# Patient Record
Sex: FEMALE | Race: WHITE
[De-identification: names, ages, dates, MRNs, and addresses within clinical notes are randomized per-mention and may not be internally consistent; named-entity substitution may affect disease eponyms.]

---

## 2017-02-21 ENCOUNTER — HOSPITAL ENCOUNTER (OUTPATIENT)
Dept: HOSPITAL 45 - C.PAPS | Age: 57
Discharge: HOME | End: 2017-02-21
Attending: OBSTETRICS & GYNECOLOGY
Payer: COMMERCIAL

## 2017-02-21 DIAGNOSIS — Z12.4: Primary | ICD-10-CM

## 2017-03-22 ENCOUNTER — HOSPITAL ENCOUNTER (OUTPATIENT)
Dept: HOSPITAL 45 - C.ULTR | Age: 57
Discharge: HOME | End: 2017-03-22
Attending: INTERNAL MEDICINE
Payer: COMMERCIAL

## 2017-03-22 ENCOUNTER — HOSPITAL ENCOUNTER (INPATIENT)
Dept: HOSPITAL 45 - C.EDB | Age: 57
LOS: 2 days | Discharge: HOME | DRG: 419 | End: 2017-03-24
Attending: SURGERY | Admitting: SURGERY
Payer: COMMERCIAL

## 2017-03-22 ENCOUNTER — HOSPITAL ENCOUNTER (OUTPATIENT)
Dept: HOSPITAL 45 - C.MAMM | Age: 57
Discharge: HOME | End: 2017-03-22
Attending: OBSTETRICS & GYNECOLOGY
Payer: COMMERCIAL

## 2017-03-22 ENCOUNTER — HOSPITAL ENCOUNTER (OUTPATIENT)
Dept: HOSPITAL 45 - C.LABBFT | Age: 57
Discharge: HOME | End: 2017-03-22
Attending: INTERNAL MEDICINE
Payer: COMMERCIAL

## 2017-03-22 VITALS
TEMPERATURE: 97.88 F | HEART RATE: 66 BPM | OXYGEN SATURATION: 99 % | SYSTOLIC BLOOD PRESSURE: 153 MMHG | DIASTOLIC BLOOD PRESSURE: 93 MMHG

## 2017-03-22 VITALS
BODY MASS INDEX: 28.59 KG/M2 | HEIGHT: 66 IN | BODY MASS INDEX: 28.59 KG/M2 | HEIGHT: 66 IN | WEIGHT: 177.91 LBS | WEIGHT: 177.91 LBS

## 2017-03-22 VITALS
DIASTOLIC BLOOD PRESSURE: 73 MMHG | OXYGEN SATURATION: 96 % | TEMPERATURE: 98.42 F | SYSTOLIC BLOOD PRESSURE: 120 MMHG | HEART RATE: 71 BPM

## 2017-03-22 DIAGNOSIS — K80.20: Primary | ICD-10-CM

## 2017-03-22 DIAGNOSIS — Z87.891: ICD-10-CM

## 2017-03-22 DIAGNOSIS — E55.9: ICD-10-CM

## 2017-03-22 DIAGNOSIS — R10.13: Primary | ICD-10-CM

## 2017-03-22 DIAGNOSIS — R73.01: ICD-10-CM

## 2017-03-22 DIAGNOSIS — E03.9: ICD-10-CM

## 2017-03-22 DIAGNOSIS — I10: ICD-10-CM

## 2017-03-22 DIAGNOSIS — K80.00: Primary | ICD-10-CM

## 2017-03-22 DIAGNOSIS — R92.0: Primary | ICD-10-CM

## 2017-03-22 LAB
ALBUMIN/GLOB SERPL: 1 {RATIO} (ref 0.9–2)
ALP SERPL-CCNC: 63 U/L (ref 45–117)
ALT SERPL-CCNC: 28 U/L (ref 12–78)
AMYLASE SERPL-CCNC: 46 U/L (ref 25–115)
ANION GAP SERPL CALC-SCNC: 9 MMOL/L (ref 3–11)
APPEARANCE UR: CLEAR
AST SERPL-CCNC: 14 U/L (ref 15–37)
BASOPHILS # BLD: 0.02 K/UL (ref 0–0.2)
BASOPHILS NFR BLD: 0.2 %
BILIRUB UR-MCNC: (no result) MG/DL
BUN SERPL-MCNC: 14 MG/DL (ref 7–18)
BUN/CREAT SERPL: 15.2 (ref 10–20)
CALCIUM SERPL-MCNC: 8.5 MG/DL (ref 8.5–10.1)
CHLORIDE SERPL-SCNC: 105 MMOL/L (ref 98–107)
CHOLEST/HDLC SERPL: 3 {RATIO}
CO2 SERPL-SCNC: 25 MMOL/L (ref 21–32)
COLOR UR: YELLOW
COMPLETE: YES
CREAT SERPL-MCNC: 0.9 MG/DL (ref 0.6–1.2)
EOSINOPHIL NFR BLD AUTO: 288 K/UL (ref 130–400)
EST. AVERAGE GLUCOSE BLD GHB EST-MCNC: 108 MG/DL
GLOBULIN SER-MCNC: 3.7 GM/DL (ref 2.5–4)
GLUCOSE SERPL-MCNC: 107 MG/DL (ref 70–99)
GLUCOSE UR QL: 61 MG/DL
HCT VFR BLD CALC: 39.3 % (ref 37–47)
IG%: 0.3 %
IMM GRANULOCYTES NFR BLD AUTO: 11.7 %
KETONES UR QL STRIP: 99 MG/DL
LYMPHOCYTES # BLD: 1.11 K/UL (ref 1.2–3.4)
MANUAL MICROSCOPIC REQUIRED?: NO
MCH RBC QN AUTO: 31.6 PG (ref 25–34)
MCHC RBC AUTO-ENTMCNC: 33.6 G/DL (ref 32–36)
MCV RBC AUTO: 94 FL (ref 80–100)
MONOCYTES NFR BLD: 4.5 %
NEUTROPHILS # BLD AUTO: 0.1 %
NEUTROPHILS NFR BLD AUTO: 83.2 %
NITRITE UR QL STRIP: (no result)
NITRITE UR QL STRIP: 128 MG/DL (ref 0–150)
PH UR STRIP: 5 [PH] (ref 4.5–7.5)
PH UR: 186 MG/DL (ref 0–200)
PMV BLD AUTO: 9.6 FL (ref 7.4–10.4)
POTASSIUM SERPL-SCNC: 3.5 MMOL/L (ref 3.5–5.1)
RBC # BLD AUTO: 4.18 M/UL (ref 4.2–5.4)
REVIEW REQ?: NO
SODIUM SERPL-SCNC: 139 MMOL/L (ref 136–145)
SP GR UR STRIP: 1.01 (ref 1–1.03)
TSH SERPL-ACNC: 1.69 UIU/ML (ref 0.3–4.5)
UROBILINOGEN UR-MCNC: (no result) MG/DL
VERY LOW DENSITY LIPOPROT CALC: 26 MG/DL
WBC # BLD AUTO: 9.5 K/UL (ref 4.8–10.8)
ZZUR CULT IF INDIC CLEAN CATCH: NO

## 2017-03-22 RX ADMIN — SODIUM CHLORIDE, SODIUM LACTATE, POTASSIUM CHLORIDE, AND CALCIUM CHLORIDE SCH MLS/HR: 600; 310; 30; 20 INJECTION, SOLUTION INTRAVENOUS at 17:55

## 2017-03-22 RX ADMIN — CEFOXITIN SODIUM SCH MLS/HR: 2 POWDER, FOR SOLUTION INTRAVENOUS at 23:26

## 2017-03-22 RX ADMIN — CEFOXITIN SODIUM SCH MLS/HR: 2 POWDER, FOR SOLUTION INTRAVENOUS at 17:55

## 2017-03-22 NOTE — HISTORY AND PHYSICAL
History & Physical


Date & Time of Service:


Mar 22, 2017 at 15:04


Chief Complaint:


Stomach Pain


Primary Care Physician:


Gumaro Briscoe M.D.


History of Present Illness


57 y/o female awoke at 02:00 last night with severe epigastric pain lasting >4 

hours. Had fish, salad and nuts last evening. No previous attacks or fatty food 

intolerance. Had appt with PCP this morning and had outpatient U/S suggesting 

acute cholecystitis. She was referred to the ED. Pain is improved, she has not 

eaten today.





Past Medical/Surgical History


Medical Problems:


(1) Essential (Primary) Hypertension


Status: Chronic  





(2) Hypothyroidism Nos


Status: Chronic 





Surgical history:


lap tubal 








Social History


Smoking Status:  Former Smoker


Occupational Status:  employed





Multi-Drug Resistant Organisms


History of MDRO:  No





Allergies


Uncoded Allergies:  


     NKDA (Allergy, Unknown, 3/16/05)





Home Medications


Scheduled


Estrog Conj/Medryoxyprog Acet (Prempro 0.3MG/1.5MG), 1 TAB PO DAILY


Hydrochlorothiazide (Hydrochlorothiazide), 1 TAB PO DAILY


Levothyroxine Sodium (Synthroid), 1 TAB PO DAILY


Losartan Potassium (Cozaar), 1 TAB PO DAILY





Review of Systems


Constitutional:  No chills, No fever


Respiratory:  No cough, No shortness of breath


Cardiovascular:  No chest pain


Abdomen:  + pain, No nausea, No vomiting





Physical Exam


Vital Signs











  Date Time  Temp Pulse Resp B/P Pulse Ox O2 Delivery O2 Flow Rate FiO2


 


3/22/17 11:54 36.8 66 17 139/89 98 Room Air  








General Appearance:  WD/WN, no apparent distress


Eyes:  sclerae normal


ENT:  normal ENT inspection


Respiratory/Chest:  lungs clear, normal breath sounds


Cardiovascular:  regular rate, rhythm


Abdomen/GI:  soft, no organomegaly, + tenderness (mild epigastric)


Extremities/Musculoskelatal:  no pedal edema


Neurologic/Psych:  alert, normal mood/affect





Diagnostics


Diagnostic Radiology


ULTRASOUND RIGHT UPPER QUADRANT ABDOMEN





CLINICAL HISTORY: Epigastric abdominal pain.





COMPARISON STUDY: No priors.





TECHNIQUE: Real-time, grayscale, and color flow sonography of the right upper


quadrant of the abdomen was performed. Images are reviewed in the transverse and


longitudinal planes.





FINDINGS:





Liver: The liver is normal in size and echotexture. There is no intrahepatic


biliary ductal dilatation. The main portal vein is patent.





Gallbladder: There are calcified mobile gallstones. The gallbladder wall is


mildly thickened and edematous measuring up to 4 mm. Trace pericholecystic fluid


is seen. A sonographic Jackson's sign is reportedly absent. The common bile duct


measures up to 0.6 cm in diameter.





Pancreas: Visualized portions of the pancreatic head and body are normal in


appearance.





Right kidney: Survey images of the right kidney demonstrate normal size and


echotexture. There is no hydronephrosis. A 2.2 cm cyst is noted in the upper


pole.





Ascites: None.








IMPRESSION: 





1. Cholelithiasis with findings concerning for acute cholecystitis. Surgical


consultation is advised. If this does not fit the clinical presentation then a


nuclear hepatobiliary scan could be considered for confirmation.





2. The common bile duct is normal in caliber measuring up to 6 mm.





Impression


Assessment and Plan


cholelithiasis, acute cholecystitis


   Will admit for laparoscopic cholecystectomy tomorrow, IV antibiotics 

overnight. Can have clears, NPO after midnight.





VTE Prophylaxis


VTE Risk Assessment Done? Y/N:  Yes


Risk Level:  Low

## 2017-03-22 NOTE — DIAGNOSTIC IMAGING REPORT
ULTRASOUND RIGHT UPPER QUADRANT ABDOMEN



CLINICAL HISTORY: Epigastric abdominal pain.



COMPARISON STUDY: No priors.



TECHNIQUE: Real-time, grayscale, and color flow sonography of the right upper

quadrant of the abdomen was performed. Images are reviewed in the transverse and

longitudinal planes.



FINDINGS:



Liver: The liver is normal in size and echotexture. There is no intrahepatic

biliary ductal dilatation. The main portal vein is patent.



Gallbladder: There are calcified mobile gallstones. The gallbladder wall is

mildly thickened and edematous measuring up to 4 mm. Trace pericholecystic fluid

is seen. A sonographic Jackson's sign is reportedly absent. The common bile duct

measures up to 0.6 cm in diameter.



Pancreas: Visualized portions of the pancreatic head and body are normal in

appearance.



Right kidney: Survey images of the right kidney demonstrate normal size and

echotexture. There is no hydronephrosis. A 2.2 cm cyst is noted in the upper

pole.



Ascites: None.





IMPRESSION: 



1. Cholelithiasis with findings concerning for acute cholecystitis. Surgical

consultation is advised. If this does not fit the clinical presentation then a

nuclear hepatobiliary scan could be considered for confirmation.



2. The common bile duct is normal in caliber measuring up to 6 mm.







Electronically signed by:  Yaron Ca M.D.

3/22/2017 10:51 AM



Dictated Date/Time:  3/22/2017 10:42 AM

## 2017-03-22 NOTE — EMERGENCY ROOM VISIT NOTE
History


First contact with patient:  12:25


Chief Complaint:  ABDOMINAL PAIN


Stated Complaint:  STOMACH PAIN


Nursing Triage Summary:  


Patient reports epigastric pain that started at 0200.  She had a PCP 

appointment 


this morning, and then they schedule ultrasound and labs to be drawn. Patient 


reprots majority of pain has subsided.  She does report very mild epigastric 


pressure.  Patient denies any nausea/vomiting with this episodes.  Ultrasound 


shows cholecystitis.





History of Present Illness


Patient is a 56-year-old white female with past medical history significant for 

hypothyroidism and hypertension who presents to the emergency department for 

further care and evaluation after having an abnormal biliary ultrasound this 

morning.  Patient states that she has been feeling well and was in her usual 

state of health until around 2:00 this morning.  She states that she woke with 

very severe, sharp, stabbing epigastric pain.  It was at its worst around 4 AM 

when she would've rated it an 8-9/10.  She states that the pain subsequently 

began to subside on its own, and she was able to go back to sleep for a little 

bit.  She denies any associated nausea or vomiting.  She did not have anything 

at home to take for her pain.  She denies ever experiencing pain similar to 

this previously and


states that it did not feel like typical heartburn.  There was no pain in her 

chest.  She did not feel any pain through to her back.  There was no 

palpitations, lightheadedness or dizziness.  





She states that she had a scheduled appointment with her PCP this morning and 

went to her appointment.  Because she was fasting he ran routine laboratory 

studies for her annual physical in addition to labs to evaluate her epigastric 

pain.  He also ordered an outpatient abdominal ultrasound.  She subsequently 

had an appointment for her annual mammogram.  After her mammogram she was 

contacted by Dr. Briscoe's office and told to come to the emergency department 

because the gallbladder ultrasound was abnormal.  At the present time the 

patient is essentially pain-free.  She rates her discomfort a 1/10.  She was on 

her way to go eat lunch with coworkers when she got called to return to the 

emergency department.  She reports a family history of gallbladder disease 

disease in both her mom and daughter.





Review of Systems


Review of systems as per HPI.  All other systems reviewed were negative.  10 

systems reviewed.





Past Medical/Surgical History


Medical Problems:


(1) Cholecystitis


(2) Essential (Primary) Hypertension


(3) Hypothyroidism Nos


Electronic medical records are reviewed and summarized as above/below.  See 

Problem List.





Social History


Smoking Status:  Former Smoker


Housing Status:  lives with family


Occupation Status:  employed





Current/Historical Medications


Scheduled


Estrog Conj/Medryoxyprog Acet (Prempro 0.3MG/1.5MG), 1 TAB PO DAILY


Hydrochlorothiazide (Hydrochlorothiazide), 1 TAB PO DAILY


Levothyroxine Sodium (Synthroid), 1 TAB PO DAILY


Losartan Potassium (Cozaar), 1 TAB PO DAILY





Allergies


Uncoded Allergies:  


     NKDA (Allergy, Unknown, 3/16/05)





Physical Exam


Vital Signs











  Date Time  Temp Pulse Resp B/P Pulse Ox O2 Delivery O2 Flow Rate FiO2


 


3/22/17 11:54 36.8 66 17 139/89 98 Room Air  











Physical Exam


CONSTITUTIONAL: Patient is a pleasant, well-appearing 56-year-old white female 

who is awake and alert and in no acute distress.


EYES:  Pupils equal, round, reactive to light and accommodation.  EOMs intact 

without nystagmus.  Sclera are anicteric. 


ENT:  Tympanic membranes intact, with normal landmarks.  External canals are 

clear.  Oral and nasopharynx are clear.  Mucous membranes are moist, no lesions

, tongue and gums appear normal.     


NECK: No bruits auscultated.  Supple without lymphadenopathy.  No thyromegaly.  

No meningeal signs.  Full active range of motion without discomfort.


CARDIOVASCULAR: Regular rate and rhythm, with normal S1 and S2, no murmur or 

gallop or rub is heard.  No carotid bruits auscultated.  No JVD.  Peripheral 

pulses easy to palpable.


RESPIRATORY: Breath sounds equal and clear to auscultation without wheezes, 

rales, or rhonchi heard.   Full and equal chest expansion without accessory 

muscle use or retractions. 


GI: Bowel sounds are present.  Abdomen is soft, nondistended, slightly tender 

in the right upper quadrant without guarding or rebound.  Negative Jackson sign.

  No CVA tenderness.  No organomegaly.  No pulsatile masses.  


MUSCULOSKELETAL: Full range of motion of extremities x 4 with good strength.  

No cyanosis, edema, joint tenderness or swelling.  No deformity.


INTEGUMENTARY: No lesions or rash, normal skin turgor. 


NEUROLOGICAL: Alert, oriented, and cooperative.  Cranial nerves, sensation and 

strength grossly intact.  Pupils round, equal, and react to light, EOMs are 

full.


LYMPH: No lymphadenopathy.





Medical Decision & Procedures


ER Provider


Diagnostic Interpretation:


ULTRASOUND RIGHT UPPER QUADRANT ABDOMEN





CLINICAL HISTORY: Epigastric abdominal pain.





COMPARISON STUDY: No priors.





TECHNIQUE: Real-time, grayscale, and color flow sonography of the right upper


quadrant of the abdomen was performed. Images are reviewed in the transverse and


longitudinal planes.





FINDINGS:





Liver: The liver is normal in size and echotexture. There is no intrahepatic


biliary ductal dilatation. The main portal vein is patent.





Gallbladder: There are calcified mobile gallstones. The gallbladder wall is


mildly thickened and edematous measuring up to 4 mm. Trace pericholecystic fluid


is seen. A sonographic Jackson's sign is reportedly absent. The common bile duct


measures up to 0.6 cm in diameter.





Pancreas: Visualized portions of the pancreatic head and body are normal in


appearance.





Right kidney: Survey images of the right kidney demonstrate normal size and


echotexture. There is no hydronephrosis. A 2.2 cm cyst is noted in the upper


pole.





Ascites: None.








IMPRESSION: 





1. Cholelithiasis with findings concerning for acute cholecystitis. Surgical


consultation is advised. If this does not fit the clinical presentation then a


nuclear hepatobiliary scan could be considered for confirmation.





2. The common bile duct is normal in caliber measuring up to 6 mm.








SINGLE VIEW CHEST





CLINICAL HISTORY:  Epigastric abdominal pain.





FINDINGS: An AP, portable, upright chest radiograph is correlated with chest CT


dated 2/28/2014. The examination is degraded by portable technique and patient


rotation.  The cardiomediastinal silhouette is unremarkable. There is minimal


left basilar atelectasis and mild elevation of left hemidiaphragm. The lungs and


pleural spaces are otherwise clear. No pneumothorax is seen. The skeletal


structures are osteopenic. The bony thorax is grossly intact.





IMPRESSION: No acute cardiopulmonary abnormality.





ED Course


The patient was seen and evaluated as above.  Her prior outpatient laboratory 

and diagnostic imaging studies were reviewed.  At the time of my examination, 

the patient is essentially pain-free.





All labs were run earlier this morning.  CBC with differential revealed a white 

count of 9500, no left shift or bandemia.  H&H 13.2 with 39.3.  Chemistries 

revealed a sodium of 139, potassium 3.5, chloride 105, carbon dioxide 25, BUN 

14 and creatinine 0.9.  Bilirubin and transaminases are within normal limits.  

Amylase and lipase are not elevated.  EKG performed at Dr. Briscoe's office 

earlier today revealed a normal sinus rhythm with no acute ischemic changes and 

no ectopy.  Chest x-ray obtained here today was unremarkable.  As noted above, 

biliary ultrasound demonstrated cholelithiasis with findings concerning for 

acute cholecystitis.  Gallbladder wall was mildly thickened and edematous 

measuring up to 4 mm with trace pericholecystic fluid.  Common bile duct 

measured 0.6 cm.





All laboratory and diagnostic imaging studies were reviewed with the patient 

and discussed with attending physician.  Surgical consultation was placed with 

Dr. Guerrero.  He will bring the patient into the hospital with plans for 

cholecystectomy tomorrow.  Please refer to surgical H&P and admission orders 

for further information.





Differential diagnoses entertained included GERD, gastritis, esophagitis, 

peptic ulcer disease, acute cholecystitis, cholelithiasis, biliary colic, 

ascending cholangitis, pancreatitis, ACS, perforation, bowel obstruction, among 

others.





Medical Decision


See ED course.





Impression





 Primary Impression:  


 Acute cholecystitis


 Additional Impression:  


 Cholelithiasis





Departure Information


Dispostion


Being Evaluated By Surgeon





Gumaro Vega M.D. (PCP)





Patient Instructions


My [facility]





Problem Qualifiers








 Additional Impression:  


 Cholelithiasis


 Cholelithiasis location:  gallbladder  Cholecystitis presence:  without 

cholecystitis  Biliary obstruction:  without biliary obstruction  Qualified 

Codes:  K80.20 - Calculus of gallbladder without cholecystitis without 

obstruction

## 2017-03-22 NOTE — MAMMOGRAPHY REPORT
BILATERAL DIGITAL DIAGNOSTIC MAMMOGRAM TOMOSYNTHESIS WITH CAD: 3/22/2017

CLINICAL HISTORY: 56-year-old woman presents for follow-up of probably benign grouped microcalcifica
tions in the superior right breast, and upper outer quadrant of the left breast.  





TECHNIQUE: Bilateral breast CC and MLO 2-D digital anterolisthesis images, spot magnification CC and
 ML views of each breast were obtained.  Current study was also evaluated with a Computer Aided Dete
ction (CAD) system.  



COMPARISON: Comparison is made to exams dated:  9/21/2016 mammogram, 3/18/2016 ultrasound, 3/18/2016
 mammogram, 3/7/2016 mammogram, and 2/28/2014 mammogram - [facility].   



BREAST COMPOSITION:  There are scattered areas of fibroglandular density in both breasts.  



FINDINGS: The parenchymal pattern is similar to prior mammograms.  The asymmetry in the lateral righ
t breast is less conspicuous on the current exam.  There are grouped faint amorphous microcalcificat
ions in the upper outer posterior right breast and upper outer posterior left breast, for which luke
tional spot magnification views were obtained.  No new suspicious mass, architectural distortion or 
new micro-calcifications are identified bilaterally.



On the spot magnification views of the right upper outer quadrant, a faint grouping of punctate and 
amorphous microcalcifications is again seen measuring 5 mm.  This does not appear significantly arellano
ged compared to the prior spot magnification views.  A possible second smaller fainter grouping is s
een inferior and posterior to the first on the spot magnification ML view.  Within the left upper ou
ter posterior breast, there is a 4 mm grouping of faint punctate and amorphous microcalcifications. 
 When comparing this grouping to previous standard mammograms, this cluster has been present dating 
back to at least 2010, suggesting benignity.





IMPRESSION:  ACR-BI-RADS CATEGORY 3: PROBABLY BENIGN

There are small faint groupings of punctate and amorphous microcalcifications in each upper outer po
sterior breast that have not significantly changed comparing to the spot magnification views perform
ed 09/21/2016.  Although these most likely represent benign fibrocystic changes, given the slight in
creased conspicuity compared to full-field mammograms in the past, repeat attention in 6 months incl
uding spot magnification views is recommended to ensure longer stability.



These results and recommendations were discussed with the patient at the time of the exam.



Approximately 10% of breast cancers are not detected with mammography. A negative mammographic repor
t should not delay biopsy if a clinically suggestive mass is present.



Missy Murguia M.D.          

ay/:3/22/2017 13:41:43  



Imaging Technologist: Tamiko DE JESUS)(NELLY), [facility]

letter sent: Follow Up Recommended 3  

BI-RADS Code: ACR-BI-RADS Category 3: Probably Benign

## 2017-03-22 NOTE — DIAGNOSTIC IMAGING REPORT
SINGLE VIEW CHEST



CLINICAL HISTORY:  Epigastric abdominal pain.



FINDINGS: An AP, portable, upright chest radiograph is correlated with chest CT

dated 2/28/2014. The examination is degraded by portable technique and patient

rotation.  The cardiomediastinal silhouette is unremarkable. There is minimal

left basilar atelectasis and mild elevation of left hemidiaphragm. The lungs and

pleural spaces are otherwise clear. No pneumothorax is seen. The skeletal

structures are osteopenic. The bony thorax is grossly intact.



IMPRESSION: No acute cardiopulmonary abnormality.







Electronically signed by:  Yaron Ca M.D.

3/22/2017 1:15 PM



Dictated Date/Time:  3/22/2017 1:14 PM

## 2017-03-23 VITALS
DIASTOLIC BLOOD PRESSURE: 79 MMHG | TEMPERATURE: 98.42 F | SYSTOLIC BLOOD PRESSURE: 138 MMHG | HEART RATE: 66 BPM | OXYGEN SATURATION: 95 %

## 2017-03-23 VITALS
HEART RATE: 83 BPM | DIASTOLIC BLOOD PRESSURE: 81 MMHG | OXYGEN SATURATION: 93 % | TEMPERATURE: 97.7 F | SYSTOLIC BLOOD PRESSURE: 134 MMHG

## 2017-03-23 VITALS — DIASTOLIC BLOOD PRESSURE: 84 MMHG | SYSTOLIC BLOOD PRESSURE: 137 MMHG | OXYGEN SATURATION: 96 % | HEART RATE: 61 BPM

## 2017-03-23 VITALS
OXYGEN SATURATION: 95 % | HEART RATE: 66 BPM | TEMPERATURE: 97.7 F | SYSTOLIC BLOOD PRESSURE: 151 MMHG | DIASTOLIC BLOOD PRESSURE: 84 MMHG

## 2017-03-23 VITALS — OXYGEN SATURATION: 97 %

## 2017-03-23 VITALS
DIASTOLIC BLOOD PRESSURE: 66 MMHG | SYSTOLIC BLOOD PRESSURE: 117 MMHG | HEART RATE: 91 BPM | TEMPERATURE: 98.6 F | OXYGEN SATURATION: 93 %

## 2017-03-23 VITALS
TEMPERATURE: 98.6 F | HEART RATE: 63 BPM | DIASTOLIC BLOOD PRESSURE: 80 MMHG | OXYGEN SATURATION: 95 % | SYSTOLIC BLOOD PRESSURE: 123 MMHG

## 2017-03-23 VITALS
HEART RATE: 66 BPM | DIASTOLIC BLOOD PRESSURE: 64 MMHG | TEMPERATURE: 97.52 F | SYSTOLIC BLOOD PRESSURE: 126 MMHG | OXYGEN SATURATION: 98 %

## 2017-03-23 PROCEDURE — 0FT44ZZ RESECTION OF GALLBLADDER, PERCUTANEOUS ENDOSCOPIC APPROACH: ICD-10-PCS | Performed by: SURGERY

## 2017-03-23 RX ADMIN — FENTANYL CITRATE PRN MCG: 50 INJECTION, SOLUTION INTRAMUSCULAR; INTRAVENOUS at 10:52

## 2017-03-23 RX ADMIN — FENTANYL CITRATE PRN MCG: 50 INJECTION, SOLUTION INTRAMUSCULAR; INTRAVENOUS at 10:42

## 2017-03-23 RX ADMIN — FENTANYL CITRATE PRN MCG: 50 INJECTION, SOLUTION INTRAMUSCULAR; INTRAVENOUS at 10:47

## 2017-03-23 RX ADMIN — MORPHINE SULFATE PRN MG: 2 INJECTION, SOLUTION INTRAMUSCULAR; INTRAVENOUS at 11:57

## 2017-03-23 RX ADMIN — SODIUM CHLORIDE, SODIUM LACTATE, POTASSIUM CHLORIDE, AND CALCIUM CHLORIDE SCH MLS/HR: 600; 310; 30; 20 INJECTION, SOLUTION INTRAVENOUS at 05:34

## 2017-03-23 RX ADMIN — CEFOXITIN SODIUM SCH MLS/HR: 2 POWDER, FOR SOLUTION INTRAVENOUS at 11:57

## 2017-03-23 RX ADMIN — CEFOXITIN SODIUM SCH MLS/HR: 2 POWDER, FOR SOLUTION INTRAVENOUS at 05:34

## 2017-03-23 RX ADMIN — SODIUM CHLORIDE, SODIUM LACTATE, POTASSIUM CHLORIDE, AND CALCIUM CHLORIDE SCH MLS/HR: 600; 310; 30; 20 INJECTION, SOLUTION INTRAVENOUS at 19:40

## 2017-03-23 RX ADMIN — MORPHINE SULFATE PRN MG: 2 INJECTION, SOLUTION INTRAMUSCULAR; INTRAVENOUS at 13:10

## 2017-03-23 RX ADMIN — HYDROCODONE BITARTRATE AND ACETAMINOPHEN PRN TAB: 5; 325 TABLET ORAL at 17:05

## 2017-03-23 NOTE — DISCHARGE INSTRUCTIONS
Discharge Instructions


Date of Service


Mar 23, 2017.





Admission


Reason for Admission:  Cholecystitis





Discharge


Discharge Diagnosis / Problem:  Laparosocopic cholecystectomy





Discharge Goals


Goal(s):  Decrease discomfort





Activity Recommendations


Activity Limitations:  as noted below


Lifting Limitations:  no more than 10 pounds


Shower/Bathe:  no limitations





.





Instructions / Follow-Up


Instructions / Follow-Up


Dr. Guerrero's office in 2 weeks, call [phone] to schedule, [facility] [address]





Current Hospital Diet


Patient's current hospital diet: Clear Liquid Diet





Discharge Diet


Recommended Diet:  Regular Diet





Pending Studies


Studies pending at discharge:  no





Laboratory Results





 Hemoglobin A1c








Test


  3/22/17


08:38 Range/Units


 


 


Estimated Average Glucose 108   mg/dl


 


Hemoglobin A1c 5.4  4.5-5.6  %








 Lipid Panel








Test


  3/22/17


08:38 Range/Units


 


 


Triglycerides Level 128  0-150  mg/dl


 


Cholesterol Level 186  0-200  mg/dl


 


HDL Cholesterol 61   mg/dl


 


Cholesterol/HDL Ratio 3.0   


 


LDL Cholesterol, Calculated 99   mg/dl











Medical Emergencies








.


Who to Call and When:





Medical Emergencies:  If at any time you feel your situation is an emergency, 

please call 911 immediately.





.





Non-Emergent Contact


Non-Emergency issues call your:  Surgeon


Call Non-Emergent contact if:  you have a fever, temperature is above 101.5, 

your pain is not controlled


.





"Provider Documentation" section prepared by Elio Field.





VTE Core Measure


Inpt VTE Proph given/why not?:  SCD's

## 2017-03-23 NOTE — [facility] OPERATIVE REPORT
Operative Report


Operative Date


Mar 23, 2017.





Pre-Operative Diagnosis





acute cholecystitis





Post-Operative Diagnosis


same





Procedure(s) Performed


lap susie





Surgeon


shena





Assistant Surgeon(s)


Sung Field





Findings


acutely inflammed gallbladder





Specimens





gallbladder





Anesthesia


get





Complication(s)


None





Disposition


Recovery Room / PACU


I attest to the content of the Intraoperative Record and any orders documented 

therein.  Any exceptions are noted below.

## 2017-03-23 NOTE — HISTORY & PHYSICAL BRIDGE NOTE
H&P Re-Evaluation


Bridge Note:


I have examined the patient, reviewed the History & Physical and in the 

interval since the performance of the History & Physical I have noted the 

following changes of clinical significance: No changes noted. we discussed the 

procedure and risks/options. answered questions. ok to proceed with lap susie

## 2017-03-23 NOTE — OPERATIVE REPORT
DATE OF OPERATION:  03/23/2017

 

PREOPERATIVE DIAGNOSIS:  Acute calculus cholecystitis.

 

POSTOPERATIVE DIAGNOSIS:  Same.

 

PROCEDURE:  Laparoscopic cholecystectomy.

 

SURGEON:  Dr. Guerrero.

 

ASSISTANT:  Erick Field PA-C.

 

ESTIMATED BLOOD LOSS:  Approximately 20 mL.

 

COMPLICATIONS:  No immediate.

 

ANESTHESIA:  General.  The patient tolerated the procedure well.

 

OPERATIVE NOTE:  After informed consent was obtained, the patient was taken

to the operating suite and placed in supine position.  After successful

intubation, the abdomen was sterilely prepped and draped in usual fashion.  A

supraumbilical incision made with an 11 blade scalpel and carried down

through the soft tissue using electrocautery.  The anterior rectus fascia was

opened using electrocautery and two #0 Vicryl stay sutures were placed. 

Peritoneum was entered using blunt finger penetration and a finger sweep was

performed.  A 12 mm Amaris trocar was placed and the abdomen was insufflated

to 18 mmHg.  Laparoscope was inserted and the abdomen examined 360 degrees. 

A subxiphoid 5 mm port, 2 right upper quadrant 5 mm ports were placed under

direct vision.  The patient was placed in reverse Trendelenburg position and

slightly airplaned to the left.  When we peeled the omentum off the

gallbladder it was acutely inflamed.  We were able to grab it and elevate it

superiorly and laterally.  A [place] dissector was used to pull adhesions

down off the neck of the gallbladder.  I was able to readily identify the

cystic duct which I  skeletonized with a [place] dissector.  It was clipped

twice proximally and once distally and transected using laparoscopic scissor.

 In similar fashion, the cystic artery was identified, skeletonized, clipped

and divided.  The gallbladder was removed from the gallbladder fossa using

electrocautery.  There was a fair amount wall edema.  Right at the end we did

make a small hole in the gallbladder, releasing a small amount of bile.  We

removed the gallbladder and placed it into an EndoCatch bag and then

immediately suctioned and irrigated the right upper quadrant.  Several small

bleeding points on the gallbladder fossa were controlled using

electrocautery.  Thorough irrigation was performed to get all the bile and

blood out of the right upper quadrant.  At the end of the procedure, there

was adequate hemostasis and no evidence of a bile leak.  We did look around

the abdomen and saw no other gross abnormalities.  There was no bile in the

pelvis.  We then removed the gallbladder as well as all the trocars and

desufflated the abdomen.  The fascia of the camera port was closed using 0

Vicryl in a figure-of-eight fashion.  All the wounds were irrigated and

closed using 4-0 Monocryl.  Marcaine was injected around the incisions for

postoperative analgesia and skin glue used as a dressing.  The patient was

awakened, extubated, and transferred to recovery in stable condition.

 

 

I attest to the content of the Intraoperative Record and any orders documented therein. Any exceptio
ns are noted below.

## 2017-03-23 NOTE — MEDICAL STUDENT: [facility]
Immediate Operative Summary


Operative Date


Mar 23, 2017.





Pre-Operative Diagnosis


acute cholecystitis





Post-Operative Diagnosis


same as above





Procedure(s) Performed





laparoscopic cholecystectomy





Surgeon


Dr. Guerrero





Assistant Surgeon(s)


Elio Feild PA-C





Estimated Blood Loss


20cc





Findings


inflamed gallbladder, normal-appearing inferior margin of liver





Specimens





A. gallbladder





Anesthesia


general





Complication(s)


None





Disposition


Recovery Room / PACU

## 2017-03-23 NOTE — ANESTHESIOLOGY PROGRESS NOTE
Anesthesia Post Op Note


Date & Time


Mar 23, 2017 at 11:39





Vital Signs


Pain Intensity:  3





 Vital Signs Past 12 Hours








  Date Time  Temp Pulse Resp B/P Pulse Ox O2 Delivery O2 Flow Rate FiO2


 


3/23/17 11:30 36.4 55 16     


 


3/23/17 11:30  56 16 127/76 97   


 


3/23/17 11:25  55 13 123/76 96   


 


3/23/17 11:25  55 13     


 


3/23/17 11:20  62 16 130/84 97   


 


3/23/17 11:20  61 16     


 


3/23/17 11:15  59 16     


 


3/23/17 11:15  59 16 148/81 98   


 


3/23/17 11:14 36.5       


 


3/23/17 11:10  56 14     


 


3/23/17 11:10  56 14 152/83 100   


 


3/23/17 11:05  56 14 156/89 100   


 


3/23/17 11:05  55 14     


 


3/23/17 11:00  60 14     


 


3/23/17 11:00  60 14 156/92 100   


 


3/23/17 10:55  57 12 169/91 100   


 


3/23/17 10:55      Nasal Cannula 3 


 


3/23/17 10:55  56 12     


 


3/23/17 10:50  53 12     


 


3/23/17 10:50  49 12 163/87 100   


 


3/23/17 10:45  58 18     


 


3/23/17 10:45  58 18 170/92 100   


 


3/23/17 10:40  64 16 164/94 100   


 


3/23/17 10:40  64 16     


 


3/23/17 10:37    162/92    


 


3/23/17 10:35 36.4 73 16 162/92 100 Mask 10 


 


3/23/17 07:52 37.0 63 16 123/80 95 Room Air  


 


3/23/17 07:25      Room Air  











Notes


Mental Status:  alert / awake / arousable, participated in evaluation


Pt Amnestic to Procedure:  Yes


Nausea / Vomiting:  adequately controlled


Pain:  adequately controlled


Airway Patency, RR, SpO2:  stable & adequate


BP & HR:  stable & adequate


Hydration State:  stable & adequate


Anesthetic Complications:  no major complications apparent

## 2017-03-24 VITALS
DIASTOLIC BLOOD PRESSURE: 57 MMHG | SYSTOLIC BLOOD PRESSURE: 121 MMHG | OXYGEN SATURATION: 95 % | HEART RATE: 88 BPM | TEMPERATURE: 98.42 F

## 2017-03-24 VITALS
DIASTOLIC BLOOD PRESSURE: 57 MMHG | TEMPERATURE: 98.42 F | HEART RATE: 88 BPM | OXYGEN SATURATION: 95 % | SYSTOLIC BLOOD PRESSURE: 121 MMHG

## 2017-03-24 VITALS
DIASTOLIC BLOOD PRESSURE: 76 MMHG | SYSTOLIC BLOOD PRESSURE: 126 MMHG | HEART RATE: 72 BPM | TEMPERATURE: 98.42 F | OXYGEN SATURATION: 95 %

## 2017-03-24 RX ADMIN — HYDROCODONE BITARTRATE AND ACETAMINOPHEN PRN TAB: 5; 325 TABLET ORAL at 08:44

## 2017-03-24 RX ADMIN — SODIUM CHLORIDE, SODIUM LACTATE, POTASSIUM CHLORIDE, AND CALCIUM CHLORIDE SCH MLS/HR: 600; 310; 30; 20 INJECTION, SOLUTION INTRAVENOUS at 07:56

## 2017-03-24 NOTE — DISCHARGE SUMMARY
PRIMARY DISCHARGE DIAGNOSES:  Cholelithiasis, acute cholecystitis.

 

SECONDARY DISCHARGE DIAGNOSES:

1.  Hypertension.

2.  Hypothyroidism.

 

PROCEDURE PERFORMED:  Laparoscopic cholecystectomy.

 

HOSPITAL COURSE:  The patient is a 56-year-old female who was referred to the

Emergency Department by her PCP after an outpatient ultrasound suggested

acute cholecystitis, which was obtained for severe epigastric pain that

occurred overnight.  She was admitted to surgery service that evening and

kept on IV antibiotics overnight.  She was taken to the operating room the

next morning for laparoscopic cholecystectomy.  She did have some wall

thickening and edema.  The procedure was well tolerated.  She was

transferred to the surgical floor and continued on perioperative antibiotics.

 On postoperative day 1, she was tolerating regular diet and oral analgesics.

 She was stable for discharge.  Her incisions were dry, abdomen was soft.

 

DISCHARGE INSTRUCTIONS:  Discharge home.  Follow up with Dr. Guerrero in 2

weeks.

 

DISCHARGE MEDICATIONS:  Norco 1-2 tablets every 4 hours as needed, Motrin 600

mg 3 times a day as needed, and resume home medications, Prempro 1 tablet

daily, hydrochlorothiazide 12.5 mg daily, Synthroid 25 mcg daily, and Cozaar

25 mg daily.

 

 

 

MTDD

## 2017-03-24 NOTE — SURGERY PROGRESS NOTE
Surgery Progress Note


Date of Service


Mar 24, 2017.





Subjective


Post OP Day:  1


+ diet (regular breakfast), + feeling well (better this AM), + pain controlled, 

No nausea





Objective


Vital Signs:











  Date Time  Temp Pulse Resp B/P Pulse Ox O2 Delivery O2 Flow Rate FiO2


 


3/24/17 06:59 36.9 88 16 121/57 95 Room Air  


 


3/24/17 03:35 36.9 72 16 126/76 95 Room Air  


 


3/24/17 00:45      Room Air  


 


3/23/17 23:16 37.0 91 18 117/66 93 Room Air  


 


3/23/17 19:25 36.5 83 18 134/81 93 Room Air  


 


3/23/17 15:59 36.9 66 16 138/79 95 Room Air  


 


3/23/17 15:45      Room Air  


 


3/23/17 14:00 36.4 66 16 126/64 98  2.0 


 


3/23/17 12:45 36.5 66 16 151/84 95  2.0 


 


3/23/17 12:08  61 16 137/84 96  2.0 


 


3/23/17 11:45     97 Nasal Cannula 2.0 


 


3/23/17 11:30 36.4 55 16     


 


3/23/17 11:30  56 16 127/76 97   


 


3/23/17 11:25  55 13 123/76 96   


 


3/23/17 11:25  55 13     


 


3/23/17 11:20  62 16 130/84 97   


 


3/23/17 11:20  61 16     


 


3/23/17 11:15  59 16     


 


3/23/17 11:15  59 16 148/81 98   


 


3/23/17 11:14 36.5       


 


3/23/17 11:10  56 14     


 


3/23/17 11:10  56 14 152/83 100   


 


3/23/17 11:05  56 14 156/89 100   


 


3/23/17 11:05  55 14     


 


3/23/17 11:00  60 14     


 


3/23/17 11:00  60 14 156/92 100   


 


3/23/17 10:55  57 12 169/91 100   


 


3/23/17 10:55      Nasal Cannula 3 


 


3/23/17 10:55  56 12     


 


3/23/17 10:50  53 12     


 


3/23/17 10:50  49 12 163/87 100   


 


3/23/17 10:45  58 18     


 


3/23/17 10:45  58 18 170/92 100   


 


3/23/17 10:40  64 16 164/94 100   


 


3/23/17 10:40  64 16     


 


3/23/17 10:37    162/92    


 


3/23/17 10:35 36.4 73 16 162/92 100 Mask 10 


 


3/23/17 07:52 37.0 63 16 123/80 95 Room Air  








Abdomen:  non distended, soft


Incision(s):  clean, dry





Assessment & Plan


s/p lap susie


   better pain control


   home if tolerates breakfast

## 2017-04-10 ENCOUNTER — HOSPITAL ENCOUNTER (OUTPATIENT)
Dept: HOSPITAL 45 - C.ULTRBC | Age: 57
Discharge: HOME | End: 2017-04-10
Attending: SURGERY
Payer: COMMERCIAL

## 2017-04-10 DIAGNOSIS — R10.13: Primary | ICD-10-CM

## 2017-04-10 LAB
ALBUMIN/GLOB SERPL: 0.9 {RATIO} (ref 0.9–2)
ALP SERPL-CCNC: 83 U/L (ref 45–117)
ALT SERPL-CCNC: 17 U/L (ref 12–78)
ANION GAP SERPL CALC-SCNC: 8 MMOL/L (ref 3–11)
AST SERPL-CCNC: 12 U/L (ref 15–37)
BASOPHILS # BLD: 0.03 K/UL (ref 0–0.2)
BASOPHILS NFR BLD: 0.3 %
BUN SERPL-MCNC: 7 MG/DL (ref 7–18)
BUN/CREAT SERPL: 7.9 (ref 10–20)
CALCIUM SERPL-MCNC: 8.8 MG/DL (ref 8.5–10.1)
CHLORIDE SERPL-SCNC: 99 MMOL/L (ref 98–107)
CO2 SERPL-SCNC: 26 MMOL/L (ref 21–32)
COMPLETE: YES
CREAT SERPL-MCNC: 0.94 MG/DL (ref 0.6–1.2)
EOSINOPHIL NFR BLD AUTO: 331 K/UL (ref 130–400)
GLOBULIN SER-MCNC: 4 GM/DL (ref 2.5–4)
GLUCOSE SERPL-MCNC: 82 MG/DL (ref 70–99)
HCT VFR BLD CALC: 36.1 % (ref 37–47)
IG%: 0.1 %
IMM GRANULOCYTES NFR BLD AUTO: 20 %
LYMPHOCYTES # BLD: 2.19 K/UL (ref 1.2–3.4)
MCH RBC QN AUTO: 31.1 PG (ref 25–34)
MCHC RBC AUTO-ENTMCNC: 34.1 G/DL (ref 32–36)
MCV RBC AUTO: 91.4 FL (ref 80–100)
MONOCYTES NFR BLD: 8.4 %
NEUTROPHILS # BLD AUTO: 1.6 %
NEUTROPHILS NFR BLD AUTO: 69.6 %
PMV BLD AUTO: 9.2 FL (ref 7.4–10.4)
POTASSIUM SERPL-SCNC: 3.3 MMOL/L (ref 3.5–5.1)
RBC # BLD AUTO: 3.95 M/UL (ref 4.2–5.4)
SODIUM SERPL-SCNC: 133 MMOL/L (ref 136–145)
WBC # BLD AUTO: 10.96 K/UL (ref 4.8–10.8)

## 2017-04-10 NOTE — DIAGNOSTIC IMAGING REPORT
PELVIC ULTRASOUND, TRANSABDOMINAL AND TRANSVAGINAL



HISTORY:      Pelvic pain.



COMPARISON: None.



FINDINGS: 

Uterus: 8.4 x 4.9 x 4.6 cm. A few small nabothian cysts. No uterine masses.  



Endometrial stripe: 5 mm in thickness. 



Right ovary: Obscured by overlying bowel.



Left ovary: Not well visualized but appears to be normal.



Miscellaneous:No pelvic free fluid.



IMPRESSION:  



1. No significant abnormality within the uterus or left ovary.

2. The right ovary was not clearly identified.







Electronically signed by:  Nasir Ward M.D.

4/10/2017 2:52 PM



Dictated Date/Time:  4/10/2017 2:49 PM

## 2017-04-10 NOTE — DIAGNOSTIC IMAGING REPORT
ABDOMEN COMPLETE (US)



CLINICAL HISTORY: Epigastric abdominal pain. Recent laparoscopic

cholecystectomy.



COMPARISON STUDY:  Right upper quadrant ultrasound March 22, 2017.



FINDINGS: This study is compromised by suboptimal penetration. There is no

biliary ductal dilatation status post cholecystectomy. No operative bed fluid

collection is identified on this exam. The pancreas is obscured. The size of the

spleen is normal. Note is made of a 2.4 cm cyst arising from the upper pole of

the right kidney. There is no hydronephrosis. Visualized portions of the IVC and

abdominal aorta are normal although both are partially obscured on this exam.



IMPRESSION:  



1. Study compromised by suboptimal penetration but no biliary ductal dilatation

or cholecystectomy bed fluid collection identified.



2. Obscured pancreas due to overlying bowel gas.



3. No hydronephrosis. 









Electronically signed by:  Mike Torres M.D.

4/10/2017 2:51 PM



Dictated Date/Time:  4/10/2017 2:49 PM

## 2017-04-17 ENCOUNTER — HOSPITAL ENCOUNTER (OUTPATIENT)
Dept: HOSPITAL 45 - C.ULTR | Age: 57
Discharge: HOME | End: 2017-04-17
Attending: INTERNAL MEDICINE
Payer: COMMERCIAL

## 2017-04-17 DIAGNOSIS — R94.6: Primary | ICD-10-CM

## 2017-04-17 NOTE — DIAGNOSTIC IMAGING REPORT
THYROID ULTRASOUND



HISTORY: Palpable thyroid  R94.6 Abnormal thyroid [id number]



COMPARISON:  None.



FINDINGS:



Right lobe:  Maximum dimension 4.1 cm. Diffuse thyroid heterogeneity.



Left lobe:  Maximum dimension 4.2 cm. Diffuse thyroid echogenicity

heterogeneity.



Isthmus:  No nodules.



IMPRESSION:  

Heterogeneous thyroid lobes bilaterally. No evidence for dominant nodule. 







Electronically signed by:  Abbe Hernadez M.D.

4/17/2017 12:46 PM



Dictated Date/Time:  4/17/2017 12:44 PM

## 2017-08-28 ENCOUNTER — HOSPITAL ENCOUNTER (OUTPATIENT)
Dept: HOSPITAL 45 - C.LABBFT | Age: 57
Discharge: HOME | End: 2017-08-28
Attending: NURSE PRACTITIONER
Payer: COMMERCIAL

## 2017-08-28 DIAGNOSIS — R10.2: Primary | ICD-10-CM

## 2017-08-28 LAB
APPEARANCE UR: CLEAR
BILIRUB UR-MCNC: (no result) MG/DL
COLOR UR: YELLOW
MANUAL MICROSCOPIC REQUIRED?: NO
NITRITE UR QL STRIP: (no result)
PH UR STRIP: 5 [PH] (ref 4.5–7.5)
REVIEW REQ?: NO
SP GR UR STRIP: 1.01 (ref 1–1.03)
URINE BILL WITH OR WITHOUT MIC: (no result)
URINE EPITHELIAL CELL AUTO: (no result) /LPF (ref 0–5)
UROBILINOGEN UR-MCNC: (no result) MG/DL

## 2017-09-22 ENCOUNTER — HOSPITAL ENCOUNTER (OUTPATIENT)
Dept: HOSPITAL 45 - C.MAMM | Age: 57
Discharge: HOME | End: 2017-09-22
Attending: OBSTETRICS & GYNECOLOGY
Payer: COMMERCIAL

## 2017-09-22 DIAGNOSIS — R92.1: Primary | ICD-10-CM

## 2017-09-22 NOTE — MAMMOGRAPHY REPORT
BILATERAL DIGITAL DIAGNOSTIC MAMMOGRAM TOMOSYNTHESIS WITH CAD: 9/22/2017

CLINICAL HISTORY: Six-month follow-up of bilateral breast calcifications.  





TECHNIQUE:  Breast tomosynthesis in addition to standard 2D mammography was performed. Current study 
was also evaluated with a Computer Aided Detection (CAD) system.  Bilateral CC and MLO 2-D and tomosy
nthesis images and bilateral spot magnification CC and ML views were obtained.



COMPARISON: Comparison is made to exams dated:  3/22/2017 mammogram, 9/21/2016 mammogram, 3/18/2016 m
ammogram, 3/7/2016 mammogram, 3/5/2015 mammogram, and 2/28/2014 mammogram - [facility].   



BREAST COMPOSITION:  There are scattered areas of fibroglandular density in both breasts.  



FINDINGS:  Faint grouped amorphous calcifications in the right upper outer quadrant appear stable on 
spot magnification views dating back to March 2016.  Small group of faint calcifications in the left 
upper outer quadrant posteriorly is stable on spot magnification views dating back to March 2016 and 
likely stable compared to multiple prior exams including the 2010 exam on full-field views.  The calc
ifications are considered benign given the morphology and long-term stability.  The remainder of both
 breasts are stable compared to prior exams, without suspicious masses, calcifications, or areas of a
rchitectural distortion noted.





IMPRESSION:  ACR BI-RADS CATEGORY 2: BENIGN

Bilateral calcifications are stable dating back to at least the March 2016 exam on spot magnification
 views, and are considered benign given the morphology and long-term stability.  There is no mammogra
phic evidence of malignancy in either breast. A 1 year screening mammogram is recommended.  The [facility] has been verbally notified of the results.  





Approximately 10% of breast cancers are not detected with mammography. A negative mammographic report
 should not delay biopsy if a clinically suggestive mass is present.



Roseann Beltre M.D.          

/:9/22/2017 12:12:09  



Imaging Technologist: Tamiko DE JESUS)(M), [facility]

letter sent: Normal 1/2  

BI-RADS Code: ACR BI-RADS Category 2: Benign

## 2018-02-26 ENCOUNTER — HOSPITAL ENCOUNTER (OUTPATIENT)
Dept: HOSPITAL 45 - C.PAPS | Age: 58
Discharge: HOME | End: 2018-02-26
Attending: OBSTETRICS & GYNECOLOGY
Payer: COMMERCIAL

## 2018-02-26 DIAGNOSIS — Z12.4: Primary | ICD-10-CM

## 2018-05-24 ENCOUNTER — HOSPITAL ENCOUNTER (OUTPATIENT)
Dept: HOSPITAL 45 - C.LABBFT | Age: 58
Discharge: HOME | End: 2018-05-24
Attending: INTERNAL MEDICINE
Payer: COMMERCIAL

## 2018-05-24 DIAGNOSIS — E03.9: ICD-10-CM

## 2018-05-24 DIAGNOSIS — R73.01: Primary | ICD-10-CM

## 2018-05-24 DIAGNOSIS — I10: ICD-10-CM

## 2018-05-24 LAB
ALBUMIN SERPL-MCNC: 3.4 GM/DL (ref 3.4–5)
ALP SERPL-CCNC: 77 U/L (ref 45–117)
ALT SERPL-CCNC: 22 U/L (ref 12–78)
AST SERPL-CCNC: 20 U/L (ref 15–37)
BASOPHILS # BLD: 0.04 K/UL (ref 0–0.2)
BASOPHILS NFR BLD: 0.7 %
BUN SERPL-MCNC: 14 MG/DL (ref 7–18)
CALCIUM SERPL-MCNC: 8.7 MG/DL (ref 8.5–10.1)
CO2 SERPL-SCNC: 24 MMOL/L (ref 21–32)
CREAT SERPL-MCNC: 1.01 MG/DL (ref 0.6–1.2)
EOS ABS #: 0.27 K/UL (ref 0–0.5)
EOSINOPHIL NFR BLD AUTO: 266 K/UL (ref 130–400)
GLUCOSE SERPL-MCNC: 94 MG/DL (ref 70–99)
HCT VFR BLD CALC: 40.1 % (ref 37–47)
HGB BLD-MCNC: 13.3 G/DL (ref 12–16)
IG#: 0.01 K/UL (ref 0–0.02)
IMM GRANULOCYTES NFR BLD AUTO: 30 %
KETONES UR QL STRIP: 93 MG/DL
LYMPHOCYTES # BLD: 1.81 K/UL (ref 1.2–3.4)
MCH RBC QN AUTO: 31.5 PG (ref 25–34)
MCHC RBC AUTO-ENTMCNC: 33.2 G/DL (ref 32–36)
MCV RBC AUTO: 95 FL (ref 80–100)
MONO ABS #: 0.57 K/UL (ref 0.11–0.59)
MONOCYTES NFR BLD: 9.4 %
NEUT ABS #: 3.34 K/UL (ref 1.4–6.5)
NEUTROPHILS # BLD AUTO: 4.5 %
NEUTROPHILS NFR BLD AUTO: 55.2 %
PH UR: 177 MG/DL (ref 0–200)
PMV BLD AUTO: 9.2 FL (ref 7.4–10.4)
POTASSIUM SERPL-SCNC: 4.1 MMOL/L (ref 3.5–5.1)
PROT SERPL-MCNC: 7.4 GM/DL (ref 6.4–8.2)
RED CELL DISTRIBUTION WIDTH CV: 12.7 % (ref 11.5–14.5)
RED CELL DISTRIBUTION WIDTH SD: 43.9 FL (ref 36.4–46.3)
SODIUM SERPL-SCNC: 137 MMOL/L (ref 136–145)
WBC # BLD AUTO: 6.04 K/UL (ref 4.8–10.8)